# Patient Record
Sex: FEMALE | Race: WHITE | NOT HISPANIC OR LATINO
[De-identification: names, ages, dates, MRNs, and addresses within clinical notes are randomized per-mention and may not be internally consistent; named-entity substitution may affect disease eponyms.]

---

## 2017-05-19 ENCOUNTER — APPOINTMENT (OUTPATIENT)
Dept: OBGYN | Facility: CLINIC | Age: 47
End: 2017-05-19

## 2017-05-19 VITALS
SYSTOLIC BLOOD PRESSURE: 130 MMHG | WEIGHT: 142 LBS | HEIGHT: 61 IN | BODY MASS INDEX: 26.81 KG/M2 | DIASTOLIC BLOOD PRESSURE: 87 MMHG

## 2017-05-22 LAB — HPV HIGH+LOW RISK DNA PNL CVX: NEGATIVE

## 2017-05-30 LAB — CYTOLOGY CVX/VAG DOC THIN PREP: NORMAL

## 2018-07-06 ENCOUNTER — APPOINTMENT (OUTPATIENT)
Dept: OBGYN | Facility: CLINIC | Age: 48
End: 2018-07-06
Payer: COMMERCIAL

## 2018-07-06 VITALS
DIASTOLIC BLOOD PRESSURE: 87 MMHG | HEART RATE: 82 BPM | HEIGHT: 61 IN | SYSTOLIC BLOOD PRESSURE: 136 MMHG | WEIGHT: 150.4 LBS | BODY MASS INDEX: 28.4 KG/M2

## 2018-07-06 DIAGNOSIS — Z87.42 PERSONAL HISTORY OF OTHER DISEASES OF THE FEMALE GENITAL TRACT: ICD-10-CM

## 2018-07-06 PROCEDURE — 99396 PREV VISIT EST AGE 40-64: CPT

## 2018-07-09 LAB — HPV HIGH+LOW RISK DNA PNL CVX: NOT DETECTED

## 2018-07-11 LAB — CYTOLOGY CVX/VAG DOC THIN PREP: NORMAL

## 2019-09-06 ENCOUNTER — APPOINTMENT (OUTPATIENT)
Dept: OBGYN | Facility: CLINIC | Age: 49
End: 2019-09-06
Payer: COMMERCIAL

## 2019-09-06 VITALS
WEIGHT: 147 LBS | DIASTOLIC BLOOD PRESSURE: 80 MMHG | HEIGHT: 61 IN | SYSTOLIC BLOOD PRESSURE: 132 MMHG | BODY MASS INDEX: 27.75 KG/M2 | HEART RATE: 80 BPM

## 2019-09-06 DIAGNOSIS — Z82.49 FAMILY HISTORY OF ISCHEMIC HEART DISEASE AND OTHER DISEASES OF THE CIRCULATORY SYSTEM: ICD-10-CM

## 2019-09-06 PROCEDURE — 99396 PREV VISIT EST AGE 40-64: CPT

## 2019-09-06 NOTE — HISTORY OF PRESENT ILLNESS
[1 Year Ago] : 1 year ago [Good] : being in good health [Healthy Diet] : a healthy diet [Regular Exercise] : regular exercise [Last Mammogram ___] : Last Mammogram was [unfilled] [Last Pap ___] : Last cervical pap smear was [unfilled] [Last Colonoscopy ___] : Last colonoscopy [unfilled] [Perimenopausal] : is perimenopausal [Pregnancy History] : pregnancy history: [Up to Date] : up to date with ~his/her~ STD screening [Menstrual Problems] : reports normal menses [Weight Concerns] : no concerns with her weight

## 2019-09-06 NOTE — CHIEF COMPLAINT
[Annual Visit] : annual visit [FreeTextEntry1] : 50YO P1 LMP 7/25 menses less regular and less frequent,no complaints.

## 2019-09-09 LAB — HPV HIGH+LOW RISK DNA PNL CVX: NOT DETECTED

## 2019-09-12 LAB — CYTOLOGY CVX/VAG DOC THIN PREP: NORMAL

## 2019-10-25 ENCOUNTER — RESULT REVIEW (OUTPATIENT)
Age: 49
End: 2019-10-25

## 2020-10-23 ENCOUNTER — APPOINTMENT (OUTPATIENT)
Dept: OBGYN | Facility: CLINIC | Age: 50
End: 2020-10-23
Payer: SELF-PAY

## 2020-10-23 VITALS
HEART RATE: 91 BPM | DIASTOLIC BLOOD PRESSURE: 80 MMHG | BODY MASS INDEX: 29.83 KG/M2 | HEIGHT: 61 IN | SYSTOLIC BLOOD PRESSURE: 140 MMHG | WEIGHT: 158 LBS

## 2020-10-23 PROCEDURE — 99396 PREV VISIT EST AGE 40-64: CPT

## 2020-10-23 NOTE — HISTORY OF PRESENT ILLNESS
[Patient reported mammogram was normal] : Patient reported mammogram was normal [Patient reported breast sonogram was normal] : Patient reported breast sonogram was normal [Patient reported PAP Smear was normal] : Patient reported PAP Smear was normal [Patient reported colonoscopy was normal] : Patient reported colonoscopy was normal [FreeTextEntry1] : 49YO P1 LMP 10/4  5/7, occassional hot flashes, no complaints. [Mammogramdate] : 5/20 [BreastSonogramDate] : 5/20 [PapSmeardate] : 2019 [ColonoscopyDate] : 8/18

## 2020-10-27 LAB
CYTOLOGY CVX/VAG DOC THIN PREP: NORMAL
HPV HIGH+LOW RISK DNA PNL CVX: NOT DETECTED

## 2021-10-21 NOTE — PHYSICAL EXAM
Patient made aware of 24/7 emergency services. [Awake] : awake [Alert] : alert [LAD] : no lymphadenopathy [Acute Distress] : no acute distress [Thyroid Nodule] : no thyroid nodule [Goiter] : no goiter [Mass] : no breast mass [Nipple Discharge] : no nipple discharge [Axillary LAD] : no axillary lymphadenopathy [Soft] : soft [Distended] : not distended [Tender] : non tender [Oriented x3] : oriented to person, place, and time [H/Smegaly] : no hepatosplenomegaly [Depressed Mood] : not depressed [Flat Affect] : affect not flat [Normal] : uterus [No Bleeding] : there was no active vaginal bleeding [Anteversion] : anteverted [Uterine Adnexae] : were not tender and not enlarged [Tenderness] : nontender [RRR, No Murmurs] : RRR, no murmurs [CTAB] : CTAB

## 2022-06-17 ENCOUNTER — APPOINTMENT (OUTPATIENT)
Dept: OBGYN | Facility: CLINIC | Age: 52
End: 2022-06-17

## 2022-09-06 ENCOUNTER — NON-APPOINTMENT (OUTPATIENT)
Age: 52
End: 2022-09-06

## 2022-09-09 ENCOUNTER — APPOINTMENT (OUTPATIENT)
Dept: PLASTIC SURGERY | Facility: CLINIC | Age: 52
End: 2022-09-09

## 2022-09-09 VITALS
TEMPERATURE: 98 F | HEIGHT: 61 IN | HEART RATE: 95 BPM | WEIGHT: 158 LBS | BODY MASS INDEX: 29.83 KG/M2 | DIASTOLIC BLOOD PRESSURE: 85 MMHG | SYSTOLIC BLOOD PRESSURE: 145 MMHG | OXYGEN SATURATION: 98 %

## 2022-09-09 PROCEDURE — 99213 OFFICE O/P EST LOW 20 MIN: CPT

## 2022-09-09 RX ORDER — MIRTAZAPINE 30 MG/1
TABLET, FILM COATED ORAL
Refills: 0 | Status: ACTIVE | COMMUNITY

## 2022-09-09 NOTE — ASSESSMENT
[FreeTextEntry1] : Family history of breast cancer.\par She is at elevated risk for breast cancer, and therefore she undergoes annual screening breast MRI.\par \par 1. Annual bilateral mammogram and breast ultrasound due 8/2023\par 2. Follow up office visit due in 6 months - 3/2023\par 3. Advised monthly self breast examinations and advised her to contact me if she has any concerns. \par 4. Advise screening bilateral breast MRI 12/2022, Rx provided. She will make appt Shaun.\par \par This patient encounter took 30 minutes today to perform records review, chart preparation, clinical encounter, coordination of care and documentation.

## 2022-09-09 NOTE — REASON FOR VISIT
[Follow-Up: _____] : a [unfilled] follow-up visit [FreeTextEntry1] : increased risk for breast cancer: MICHELLE lifetime estimated breast cancer risk 34.9%

## 2022-09-09 NOTE — CONSULT LETTER
[Dear  ___] : Dear  [unfilled], [Consult Letter:] : I had the pleasure of evaluating your patient, [unfilled]. [Please see my note below.] : Please see my note below. [Consult Closing:] : Thank you very much for allowing me to participate in the care of this patient.  If you have any questions, please do not hesitate to contact me. [Sincerely,] : Sincerely, [FreeTextEntry3] : Susan M. Palleschi, MD, FACS\par Division of Breast Surgery\par Director, Breast Surgery\par [facility]\par [address] [place] [address]\par [address]\[place] [address]\par (Phone) [phone]\par (Fax) [phone]

## 2022-09-09 NOTE — HISTORY OF PRESENT ILLNESS
[FreeTextEntry1] : Patient is a 52 year old female, here today for routine breast evaluation. \par She was last seen by me in office on 2/18/2022. \par She has a family history of breast cancer in her grandmother at age 80 and in her sister at age 40. \par MICHELLE lifetime estimated breast cancer risk 34.9%\par She has a history of right 10:00 breast biopsy in 11/2006, pathology benign. \par s/p bilateral breast augmentation\par 8/31/2021 Bilateral mammogram/ultrasound: benign, Bi-rads 2.\par 10/29/2021 Bilateral breast MRI: benign, Bi-rads 2. Advise 1 year followup.\par 8/8/2022 Bilateral mammogram: The patient had clear/yellow nipple discharge on the left upon mammography compression. No significant masses, calcifications, or other abnormal findings are seen in either breast. There are bilateral intact retropectoral silicone implants. \par Bilateral breast ultrasound: No suspicious findings were seen sonographically in either breast. There are bilateral stable benign appearing inframammary lymph nodes as follows-\par Right 9:00 (10cmFN) measuring 7 x 5 x 6 mm\par Right 9:00 (10cmFN) measuring 3 x 2 x 3 mm\par Left 2:00 axillary tail measuring 6 x 5 x 6 mm\par There is no mammographic or sonographic evidence for malignancy in either breast. The patient was informed that if she sees unilateral spontaneous nipple discharge, breast MRI should be considered. She does go for routine annual screening breast MRI. Bi-rads 2. \par 9/9/2022- patient presents for 6 month follow up. She is doing well although reports she has been depressed over the last few months since she sold her house and her daughter went away to school. However she started taking mirtazapine and has been doing significantly better. Denies any breast symptoms.

## 2022-09-09 NOTE — PHYSICAL EXAM
[Normocephalic] : normocephalic [Supple] : supple [No dominant masses] : no dominant masses in right breast  [No dominant masses] : no dominant masses left breast [No Nipple Retraction] : no left nipple retraction [No Nipple Discharge] : no left nipple discharge [No Axillary Lymphadenopathy] : no left axillary lymphadenopathy [Soft] : abdomen soft [No Edema] : no edema [No Swelling] : no swelling [Full ROM] : full range of motion [No Rashes] : no rashes [No Ulceration] : no ulceration [Atraumatic] : atraumatic [EOMI] : extra ocular movement intact [Sclera nonicteric] : sclera nonicteric [No Supraclavicular Adenopathy] : no supraclavicular adenopathy [No Cervical Adenopathy] : no cervical adenopathy [No Thyromegaly] : no thyromegaly [Examined in the supine and seated position] : examined in the supine and seated position [Breast Nipple Inversion] : nipples not inverted [Breast Nipple Retraction] : nipples not retracted [Breast Abnormal Secretion Bloody Fluid] : no bloody discharge [de-identified] : s/p mastopexy and breast implant placement

## 2022-10-17 ENCOUNTER — APPOINTMENT (OUTPATIENT)
Dept: ORTHOPEDIC SURGERY | Facility: CLINIC | Age: 52
End: 2022-10-17

## 2022-10-17 VITALS — WEIGHT: 160 LBS | BODY MASS INDEX: 29.82 KG/M2 | HEIGHT: 61.5 IN

## 2022-10-17 DIAGNOSIS — M79.18 MYALGIA, OTHER SITE: ICD-10-CM

## 2022-10-17 PROCEDURE — 99204 OFFICE O/P NEW MOD 45 MIN: CPT | Mod: 25

## 2022-10-17 PROCEDURE — 20610 DRAIN/INJ JOINT/BURSA W/O US: CPT

## 2022-10-17 PROCEDURE — 73564 X-RAY EXAM KNEE 4 OR MORE: CPT | Mod: RT

## 2022-10-17 PROCEDURE — J3490M: CUSTOM

## 2022-10-17 NOTE — IMAGING

## 2022-10-17 NOTE — HISTORY OF PRESENT ILLNESS
[de-identified] : 52 year old female  ( LHD,  ) chronic left knee pain wroseing since 10/1/22  \par The pain is located  anterior, medial \par The pain is associated with  weakness, swelling \par Worse with activity and better at rest.\par Has tried ice, Advil \par

## 2022-10-17 NOTE — ASSESSMENT
[FreeTextEntry1] : Right X-Ray Examination of the KNEE (4 views): medial and patellofemoral degenerate changes.\par \par - We discussed their diagnosis and treatment options at length including the risks and benefits of both surgical and non-surgical options.\par - We will continue conservative treatment with activity modification, PT, icing, weight loss, and anti-inflammatory medications.\par - The patient was provided with a PT prescription to work on ROM, hip ER/abductors strengthening, quad/hamstring stretches and strengthening, and other exercises \par - The patient was advised to let pain guide the gradual advancement of activities. \par - We also discussed the possible of a corticosteroid injection in order to help decrease inflammation and pain so that they can perform better therapy.\par - The risks, benefits, and alternatives to corticosteroid injection were reviewed with the patient and they wished to proceed with this treatment course. \par - Follow up as needed in 6 weeks to re-evaluate, if no improvement we spoke about possibility of viscosupplementation injections\par

## 2023-01-09 ENCOUNTER — APPOINTMENT (OUTPATIENT)
Dept: ORTHOPEDIC SURGERY | Facility: CLINIC | Age: 53
End: 2023-01-09
Payer: COMMERCIAL

## 2023-01-09 VITALS — WEIGHT: 160 LBS | HEIGHT: 61.5 IN | BODY MASS INDEX: 29.82 KG/M2

## 2023-01-09 PROCEDURE — 99214 OFFICE O/P EST MOD 30 MIN: CPT

## 2023-01-09 NOTE — HISTORY OF PRESENT ILLNESS
[Dull/Aching] : dull/aching [Sharp] : sharp [Intermittent] : intermittent [Walking/activity] : walking/activity [Sitting] : sitting [Lying in bed] : lying in bed [Full time] : Work status: full time [de-identified] : Patient is a 52-year-old female with onset of pain right knee after starting workout program in September.  No injury.  She had did flareup of pain and sore Dr. Kahn on October 17.  She had cortisone injection with some mild improvement.  She had gone for physical therapy and has been doing home exercises.  She has mild pain right knee standing and walking.  Mild to moderate pain with stairs and movie sign. [] : Post Surgical Visit: no [de-identified] : Dr. Kahn [de-identified] : 10/17/22 [de-identified] :

## 2023-01-09 NOTE — DISCUSSION/SUMMARY
[de-identified] : Ice p.r.n.\par Tylenol p.r.n.\par She will avoid irritating activities and exercises\par Continue home exercise program\par She will have MRI right knee\par I discussed Euflexxa injections and gave her a pamphlet.  Risks benefits and the alternatives were discussed.  She would like to do this\par \par Impression:\par Mild osteoarthritis right knee/chondromalacia patella, rule out tear medial meniscus

## 2023-01-09 NOTE — IMAGING
[Bilateral] : knee bilaterally [de-identified] : Normal gait\par \par Right knee\par No swelling\par Mild medial facet and joint line tenderness\par Passive range of motion 0° to 125°\par Ligaments are stable\par Quad strength 5/5\par \par Right leg\par No swelling\par Calf is soft and nontender\par Dorsalis pedis pulse 2+ [FreeTextEntry9] : Reviewed and interpreted.  X-rays right knee done October 17, 2022.  AP standing, lateral, sunrise, tunnel views-mild degenerative changes with narrowing the medial compartment on AP standing view, spurring patellofemoral joint.  Vascular clips medial soft tissues\par \par Reviewed and interpreted.  X-rays left knee done October 17, 2022.  AP standing, lateral, sunrise, tunnel views-mild to moderate degenerative changes with narrowing the medial compartment on AP standing view, spurring patellofemoral joint

## 2023-01-12 ENCOUNTER — FORM ENCOUNTER (OUTPATIENT)
Age: 53
End: 2023-01-12

## 2023-01-13 ENCOUNTER — APPOINTMENT (OUTPATIENT)
Dept: MRI IMAGING | Facility: CLINIC | Age: 53
End: 2023-01-13
Payer: COMMERCIAL

## 2023-01-13 PROCEDURE — 73721 MRI JNT OF LWR EXTRE W/O DYE: CPT | Mod: RT

## 2023-01-17 ENCOUNTER — NON-APPOINTMENT (OUTPATIENT)
Age: 53
End: 2023-01-17

## 2023-01-30 ENCOUNTER — APPOINTMENT (OUTPATIENT)
Dept: ORTHOPEDIC SURGERY | Facility: CLINIC | Age: 53
End: 2023-01-30
Payer: COMMERCIAL

## 2023-01-30 VITALS — BODY MASS INDEX: 29.82 KG/M2 | HEIGHT: 61.5 IN | WEIGHT: 160 LBS

## 2023-01-30 DIAGNOSIS — R93.6 ABNORMAL FINDINGS ON DIAGNOSTIC IMAGING OF LIMBS: ICD-10-CM

## 2023-01-30 PROCEDURE — 99214 OFFICE O/P EST MOD 30 MIN: CPT | Mod: 25

## 2023-01-30 PROCEDURE — 20611 DRAIN/INJ JOINT/BURSA W/US: CPT | Mod: RT

## 2023-02-06 ENCOUNTER — APPOINTMENT (OUTPATIENT)
Dept: ORTHOPEDIC SURGERY | Facility: CLINIC | Age: 53
End: 2023-02-06
Payer: COMMERCIAL

## 2023-02-06 PROCEDURE — 20611 DRAIN/INJ JOINT/BURSA W/US: CPT

## 2023-02-06 RX ORDER — HYALURONATE SODIUM 20 MG/2 ML
20 SYRINGE (ML) INTRAARTICULAR
Refills: 0 | Status: COMPLETED | OUTPATIENT
Start: 2023-02-06

## 2023-02-06 RX ADMIN — Medication MG/2ML: at 00:00

## 2023-02-06 NOTE — HISTORY OF PRESENT ILLNESS
[Gradual] : gradual [3] : 3 [Dull/Aching] : dull/aching [Intermittent] : intermittent [Rest] : rest [Stairs] : stairs [Full time] : Work status: full time [1] : 1 [Euflexxa] : Euflexxa [de-identified] : The patient has continued pain right knee.  Mild pain standing and walking.  Pain with stairs and movie sign.  Doing home exercises.  She had MRI right knee [] : Post Surgical Visit: no [de-identified] : 01/09/23 [de-identified] : Dr. Delarosa

## 2023-02-06 NOTE — DISCUSSION/SUMMARY
[de-identified] : MRI results right knee were discussed with the patient\par Ice p.r.n.\par Tylenol p.r.n.\par She will avoid irritating activities and exercises\par Continue home exercise program\par Again, I discussed Euflexxa injections.  Risks benefits and the alternatives were discussed.  She would like to do this\par She is going to be going to [facility] for several days on February 9\par \par Impression:\par Mild osteoarthritis right knee/chondromalacia patella, Tear medial meniscus

## 2023-02-06 NOTE — DISCUSSION/SUMMARY
[de-identified] : MRI results right knee were discussed with the patient\par Ice p.r.n.\par Tylenol p.r.n.\par She will avoid irritating activities and exercises\par Continue home exercise program\par She is going to be going to [facility] for several days on February 8\par \par Impression:\par Mild osteoarthritis right knee/chondromalacia patella, Tear medial meniscus

## 2023-02-06 NOTE — PROCEDURE
[Large Joint Injection] : Large joint injection [Right] : of the right [Knee] : knee [Pain] : pain [Alcohol] : alcohol [Betadine] : betadine [Ethyl Chloride sprayed topically] : ethyl chloride sprayed topically [Sterile technique used] : sterile technique used [Euflexxa(20mg)] : 20mg of Euflexxa [#1] : series #1 [Patient was advised to rest the joint(s) for ____ days] : patient was advised to rest the joint(s) for [unfilled] days [Risks, benefits, alternatives discussed / Verbal consent obtained] : the risks benefits, and alternatives have been discussed, and verbal consent was obtained [de-identified] : To ensure intra-articular injection [FreeTextEntry3] : Do not submerge underwater for 24 hours

## 2023-02-06 NOTE — DATA REVIEWED
[MRI] : MRI [Right] : of the right [Knee] : knee [I independently reviewed and interpreted images and report] : I independently reviewed and interpreted images and report [FreeTextEntry1] : MRI right knee done January 13, 2023 was reviewed. There is complex tear medial meniscus. Degenerative changes medial compartment with mild edema posteromedial tibial plateau. Mild to moderate effusion. \par

## 2023-02-06 NOTE — HISTORY OF PRESENT ILLNESS
[Gradual] : gradual [3] : 3 [Dull/Aching] : dull/aching [Intermittent] : intermittent [Leisure] : leisure [Rest] : rest [Injection therapy] : injection therapy [Stairs] : stairs [Full time] : Work status: full time [2] : 2 [Euflexxa] : Euflexxa [de-identified] : The patient has continued pain right knee.  Mild pain standing and walking.  Pain after sitting and getting up.  No change after the first Euflexxa injection [] : Post Surgical Visit: no [de-identified] : 1/30/2023 [de-identified] : Dr. Delarosa  [de-identified] : 1/30/2023

## 2023-02-06 NOTE — PROCEDURE
[Large Joint Injection] : Large joint injection [Right] : of the right [Knee] : knee [Pain] : pain [Alcohol] : alcohol [Betadine] : betadine [Ethyl Chloride sprayed topically] : ethyl chloride sprayed topically [Sterile technique used] : sterile technique used [Euflexxa(20mg)] : 20mg of Euflexxa [#2] : series #2 [Patient was advised to rest the joint(s) for ____ days] : patient was advised to rest the joint(s) for [unfilled] days [Risks, benefits, alternatives discussed / Verbal consent obtained] : the risks benefits, and alternatives have been discussed, and verbal consent was obtained [de-identified] : To ensure intra-articular injection [FreeTextEntry3] : Do not submerge underwater for 24 hours

## 2023-02-06 NOTE — IMAGING
[de-identified] : Normal gait\par \par Right knee\par No swelling\par Mild medial facet and joint line tenderness\par Passive range of motion 0° to 125°\par \par Right leg\par No swelling\par Calf is soft and nontender\par

## 2023-02-06 NOTE — IMAGING
[de-identified] : Normal gait\par \par Right knee\par No swelling\par Mild medial facet and joint line tenderness\par Passive range of motion 0° to 125°\par \par Right leg\par No swelling\par Calf is soft and nontender\par

## 2023-02-27 ENCOUNTER — APPOINTMENT (OUTPATIENT)
Dept: ORTHOPEDIC SURGERY | Facility: CLINIC | Age: 53
End: 2023-02-27
Payer: COMMERCIAL

## 2023-02-27 VITALS — BODY MASS INDEX: 29.82 KG/M2 | WEIGHT: 160 LBS | HEIGHT: 61.5 IN

## 2023-02-27 DIAGNOSIS — M17.11 UNILATERAL PRIMARY OSTEOARTHRITIS, RIGHT KNEE: ICD-10-CM

## 2023-02-27 DIAGNOSIS — M22.41 CHONDROMALACIA PATELLAE, RIGHT KNEE: ICD-10-CM

## 2023-02-27 PROCEDURE — 20611 DRAIN/INJ JOINT/BURSA W/US: CPT

## 2023-02-27 RX ORDER — HYALURONATE SODIUM 20 MG/2 ML
20 SYRINGE (ML) INTRAARTICULAR
Refills: 0 | Status: COMPLETED | OUTPATIENT
Start: 2023-02-27

## 2023-02-27 RX ADMIN — Medication MG/2ML: at 00:00

## 2023-02-27 NOTE — IMAGING
[de-identified] : Normal gait\par \par Right knee\par No swelling\par Mild medial facet and joint line tenderness\par Passive range of motion 0° to 125°\par \par Right leg\par No swelling\par Calf is soft and nontender\par

## 2023-02-27 NOTE — PROCEDURE
[Large Joint Injection] : Large joint injection [Right] : of the right [Knee] : knee [Pain] : pain [Alcohol] : alcohol [Betadine] : betadine [Ethyl Chloride sprayed topically] : ethyl chloride sprayed topically [Sterile technique used] : sterile technique used [Euflexxa(20mg)] : 20mg of Euflexxa [#3] : series #3 [Patient was advised to rest the joint(s) for ____ days] : patient was advised to rest the joint(s) for [unfilled] days [Risks, benefits, alternatives discussed / Verbal consent obtained] : the risks benefits, and alternatives have been discussed, and verbal consent was obtained [de-identified] : To ensure intra-articular injection [FreeTextEntry3] : Do not submerge underwater for 24 hours

## 2023-02-27 NOTE — DISCUSSION/SUMMARY
[de-identified] : Ice p.r.n.\par Tylenol p.r.n.\par She will avoid irritating activities and exercises\par Continue home exercise program\par Return for followup in 6 weeks if any persistent problems, otherwise p.r.n.\par \par Impression:\par Mild osteoarthritis right knee/chondromalacia patella, Tear medial meniscus

## 2023-02-27 NOTE — HISTORY OF PRESENT ILLNESS
[Gradual] : gradual [Dull/Aching] : dull/aching [Intermittent] : intermittent [Leisure] : leisure [Rest] : rest [Walking] : walking [Full time] : Work status: full time [3] : 3 [Euflexxa] : Euflexxa [de-identified] : The patient feels better after the second Euflexxa injection right knee.  She has mild pain occasionally standing and walking [] : Post Surgical Visit: no [de-identified] : 2/6/2023 [de-identified] : Dr. Delarosa  [de-identified] :   [de-identified] : 2/6/2023

## 2023-04-26 ENCOUNTER — NON-APPOINTMENT (OUTPATIENT)
Age: 53
End: 2023-04-26

## 2023-05-01 DIAGNOSIS — Z80.3 FAMILY HISTORY OF MALIGNANT NEOPLASM OF BREAST: ICD-10-CM

## 2023-05-22 ENCOUNTER — APPOINTMENT (OUTPATIENT)
Dept: OBGYN | Facility: CLINIC | Age: 53
End: 2023-05-22
Payer: COMMERCIAL

## 2023-05-22 VITALS — DIASTOLIC BLOOD PRESSURE: 87 MMHG | BODY MASS INDEX: 30.49 KG/M2 | WEIGHT: 164 LBS | SYSTOLIC BLOOD PRESSURE: 134 MMHG

## 2023-05-22 DIAGNOSIS — M17.11 UNILATERAL PRIMARY OSTEOARTHRITIS, RIGHT KNEE: ICD-10-CM

## 2023-05-22 DIAGNOSIS — Z01.419 ENCOUNTER FOR GYNECOLOGICAL EXAMINATION (GENERAL) (ROUTINE) W/OUT ABNORMAL FINDINGS: ICD-10-CM

## 2023-05-22 DIAGNOSIS — N95.2 POSTMENOPAUSAL ATROPHIC VAGINITIS: ICD-10-CM

## 2023-05-22 PROCEDURE — 99396 PREV VISIT EST AGE 40-64: CPT

## 2023-05-22 RX ORDER — LORAZEPAM 0.5 MG/1
0.5 TABLET ORAL
Qty: 60 | Refills: 0 | Status: ACTIVE | COMMUNITY
Start: 2023-04-18

## 2023-05-22 RX ORDER — ESTRADIOL 0.1 MG/G
0.1 CREAM VAGINAL
Qty: 1 | Refills: 2 | Status: ACTIVE | COMMUNITY
Start: 2023-05-22 | End: 1900-01-01

## 2023-05-22 RX ORDER — SODIUM SULFATE, MAGNESIUM SULFATE, AND POTASSIUM CHLORIDE 17.75; 2.7; 2.25 G/1; G/1; G/1
1479-225-188 TABLET ORAL
Qty: 24 | Refills: 0 | Status: COMPLETED | COMMUNITY
Start: 2023-03-13

## 2023-05-22 NOTE — HISTORY OF PRESENT ILLNESS
[Patient reported mammogram was normal] : Patient reported mammogram was normal [Patient reported breast sonogram was normal] : Patient reported breast sonogram was normal [Patient reported colonoscopy was normal] : Patient reported colonoscopy was normal [FreeTextEntry1] : 53YO P1 LMP 4/30/22 had major depressive episode last yr [id number], responded to Remeron, which she is now weaning, no major complaints. [Mammogramdate] : 8/22 [BreastSonogramDate] : 8/22 [PapSmeardate] : 2020 [ColonoscopyDate] : 4/23

## 2023-05-23 ENCOUNTER — TRANSCRIPTION ENCOUNTER (OUTPATIENT)
Age: 53
End: 2023-05-23

## 2023-05-23 LAB — HPV HIGH+LOW RISK DNA PNL CVX: NOT DETECTED

## 2023-05-26 ENCOUNTER — OUTPATIENT (OUTPATIENT)
Dept: OUTPATIENT SERVICES | Facility: HOSPITAL | Age: 53
LOS: 1 days | End: 2023-05-26
Payer: COMMERCIAL

## 2023-05-26 ENCOUNTER — APPOINTMENT (OUTPATIENT)
Dept: MRI IMAGING | Facility: CLINIC | Age: 53
End: 2023-05-26
Payer: COMMERCIAL

## 2023-05-26 DIAGNOSIS — Z80.3 FAMILY HISTORY OF MALIGNANT NEOPLASM OF BREAST: ICD-10-CM

## 2023-05-26 LAB — CYTOLOGY CVX/VAG DOC THIN PREP: NORMAL

## 2023-05-26 PROCEDURE — A9585: CPT

## 2023-05-26 PROCEDURE — C8908: CPT

## 2023-05-26 PROCEDURE — C8937: CPT

## 2023-05-26 PROCEDURE — 77049 MRI BREAST C-+ W/CAD BI: CPT | Mod: 26

## 2023-05-30 ENCOUNTER — NON-APPOINTMENT (OUTPATIENT)
Age: 53
End: 2023-05-30

## 2023-07-25 ENCOUNTER — APPOINTMENT (OUTPATIENT)
Dept: PLASTIC SURGERY | Facility: CLINIC | Age: 53
End: 2023-07-25
Payer: COMMERCIAL

## 2023-07-25 VITALS
SYSTOLIC BLOOD PRESSURE: 139 MMHG | DIASTOLIC BLOOD PRESSURE: 91 MMHG | BODY MASS INDEX: 30.21 KG/M2 | OXYGEN SATURATION: 99 % | HEART RATE: 96 BPM | WEIGHT: 160 LBS | HEIGHT: 61 IN | TEMPERATURE: 97.2 F

## 2023-07-25 DIAGNOSIS — Z12.39 ENCOUNTER FOR OTHER SCREENING FOR MALIGNANT NEOPLASM OF BREAST: ICD-10-CM

## 2023-07-25 DIAGNOSIS — Z91.89 OTHER SPECIFIED PERSONAL RISK FACTORS, NOT ELSEWHERE CLASSIFIED: ICD-10-CM

## 2023-07-25 PROCEDURE — 99213 OFFICE O/P EST LOW 20 MIN: CPT

## 2023-07-25 NOTE — PHYSICAL EXAM
[Normocephalic] : normocephalic [Atraumatic] : atraumatic [Supple] : supple [No Supraclavicular Adenopathy] : no supraclavicular adenopathy [No Cervical Adenopathy] : no cervical adenopathy [No Thyromegaly] : no thyromegaly [Examined in the supine and seated position] : examined in the supine and seated position [No dominant masses] : no dominant masses in right breast  [No dominant masses] : no dominant masses left breast [No Nipple Retraction] : no left nipple retraction [No Nipple Discharge] : no left nipple discharge [No Axillary Lymphadenopathy] : no left axillary lymphadenopathy [No Edema] : no edema [No Rashes] : no rashes [No Ulceration] : no ulceration [de-identified] :  s/p mastopexy and breast implants

## 2023-07-25 NOTE — HISTORY OF PRESENT ILLNESS
[FreeTextEntry1] : Patient is a 52 year old female here today for a follow up visit. \par She has a family history of breast cancer in her grandmother at age 80 and in her sister at age 40. \par MICHELLE lifetime estimated breast cancer risk 34.9%\par She has a history of right 10:00 breast biopsy in 11/2006, pathology benign. \par s/p bilateral breast augmentation\par 10/29/2021 Bilateral breast MRI: benign, Bi-rads 2. Advise 1 year followup.\par 8/8/2022 Bilateral mammogram: The patient had clear/yellow nipple discharge on the left upon mammography compression. No significant masses, calcifications, or other abnormal findings are seen in either breast. There are bilateral intact retropectoral silicone implants. \par Bilateral breast ultrasound: No suspicious findings were seen sonographically in either breast. There are bilateral stable benign appearing inframammary lymph nodes as follows-\par Right 9:00 (10cmFN) measuring 7 x 5 x 6 mm\par Right 9:00 (10cmFN) measuring 3 x 2 x 3 mm\par Left 2:00 axillary tail measuring 6 x 5 x 6 mm\par There is no mammographic or sonographic evidence for malignancy in either breast. The patient was informed that if she sees unilateral spontaneous nipple discharge, breast MRI should be considered. She does go for routine annual screening breast MRI. Bi-rads 2. \par 5/26/2023 Bilateral breast MRI: negative, intact bilateral implants. Bi-rads 2\par She denies any concerns on self breast examination.  She will be moving to [place] at the end of 8/2023

## 2023-07-25 NOTE — ASSESSMENT
[FreeTextEntry1] : Elevated breast cancer risk\par Clinical breast examination negative\par \par 1. Annual bilateral mammogram and breast ultrasound due 8/2023. Rx, Shaun.\par 2. High risk screening breast MRI 5/2024.\par 3. Follow up office visit 1 year\par 4. Advised monthly self breast examinations and advised her to contact me if she has any concerns. \par 5.  She is moving to [place].  She will likely undergo breast imaging next year in [place] and return to me once a year for her annual clinical breast examination.

## 2023-07-25 NOTE — CONSULT LETTER
[Dear  ___] : Dear  [unfilled], [Courtesy Letter:] : I had the pleasure of seeing your patient, [unfilled], in my office today. [Please see my note below.] : Please see my note below. [Sincerely,] : Sincerely, [FreeTextEntry3] : Susan M. Palleschi, MD, FACS\par Division of Breast Surgery\par Director, Breast Surgery\par [facility]\par [address] [place] [address]\par [address]\[place] [address]\par (Phone) [phone]\par (Fax) [phone]

## 2023-08-25 ENCOUNTER — RESULT REVIEW (OUTPATIENT)
Age: 53
End: 2023-08-25

## 2023-08-25 ENCOUNTER — APPOINTMENT (OUTPATIENT)
Dept: RADIOLOGY | Facility: CLINIC | Age: 53
End: 2023-08-25
Payer: COMMERCIAL

## 2023-08-25 ENCOUNTER — APPOINTMENT (OUTPATIENT)
Dept: ULTRASOUND IMAGING | Facility: CLINIC | Age: 53
End: 2023-08-25
Payer: COMMERCIAL

## 2023-08-25 ENCOUNTER — APPOINTMENT (OUTPATIENT)
Dept: MAMMOGRAPHY | Facility: CLINIC | Age: 53
End: 2023-08-25
Payer: COMMERCIAL

## 2023-08-25 ENCOUNTER — OUTPATIENT (OUTPATIENT)
Dept: OUTPATIENT SERVICES | Facility: HOSPITAL | Age: 53
LOS: 1 days | End: 2023-08-25
Payer: COMMERCIAL

## 2023-08-25 DIAGNOSIS — Z91.89 OTHER SPECIFIED PERSONAL RISK FACTORS, NOT ELSEWHERE CLASSIFIED: ICD-10-CM

## 2023-08-25 DIAGNOSIS — Z12.39 ENCOUNTER FOR OTHER SCREENING FOR MALIGNANT NEOPLASM OF BREAST: ICD-10-CM

## 2023-08-25 PROCEDURE — 77063 BREAST TOMOSYNTHESIS BI: CPT | Mod: 26

## 2023-08-25 PROCEDURE — 77067 SCR MAMMO BI INCL CAD: CPT | Mod: 26

## 2023-08-25 PROCEDURE — 77067 SCR MAMMO BI INCL CAD: CPT

## 2023-08-25 PROCEDURE — 77085 DXA BONE DENSITY AXL VRT FX: CPT | Mod: 26

## 2023-08-25 PROCEDURE — 76641 ULTRASOUND BREAST COMPLETE: CPT

## 2023-08-25 PROCEDURE — 76641 ULTRASOUND BREAST COMPLETE: CPT | Mod: 26,50

## 2023-08-25 PROCEDURE — 77085 DXA BONE DENSITY AXL VRT FX: CPT

## 2023-08-25 PROCEDURE — 77063 BREAST TOMOSYNTHESIS BI: CPT

## 2024-05-24 ENCOUNTER — APPOINTMENT (OUTPATIENT)
Dept: MRI IMAGING | Facility: CLINIC | Age: 54
End: 2024-05-24
Payer: COMMERCIAL

## 2024-05-24 ENCOUNTER — OUTPATIENT (OUTPATIENT)
Dept: OUTPATIENT SERVICES | Facility: HOSPITAL | Age: 54
LOS: 1 days | End: 2024-05-24
Payer: COMMERCIAL

## 2024-05-24 DIAGNOSIS — Z91.89 OTHER SPECIFIED PERSONAL RISK FACTORS, NOT ELSEWHERE CLASSIFIED: ICD-10-CM

## 2024-05-24 PROCEDURE — 77049 MRI BREAST C-+ W/CAD BI: CPT | Mod: 26

## 2024-05-24 PROCEDURE — A9585: CPT

## 2024-05-24 PROCEDURE — C8908: CPT

## 2024-05-24 PROCEDURE — C8937: CPT

## 2024-05-28 ENCOUNTER — NON-APPOINTMENT (OUTPATIENT)
Age: 54
End: 2024-05-28

## 2024-08-12 ENCOUNTER — APPOINTMENT (OUTPATIENT)
Dept: PLASTIC SURGERY | Facility: CLINIC | Age: 54
End: 2024-08-12
Payer: COMMERCIAL

## 2024-08-12 VITALS
OXYGEN SATURATION: 99 % | BODY MASS INDEX: 30.21 KG/M2 | SYSTOLIC BLOOD PRESSURE: 149 MMHG | TEMPERATURE: 98.2 F | HEART RATE: 101 BPM | DIASTOLIC BLOOD PRESSURE: 93 MMHG | WEIGHT: 160 LBS | HEIGHT: 61 IN

## 2024-08-12 DIAGNOSIS — Z91.89 OTHER SPECIFIED PERSONAL RISK FACTORS, NOT ELSEWHERE CLASSIFIED: ICD-10-CM

## 2024-08-12 PROCEDURE — 99213 OFFICE O/P EST LOW 20 MIN: CPT

## 2024-08-12 NOTE — ASSESSMENT
[FreeTextEntry1] : Elevated breast cancer risk Clinical breast examination negative Recent breast MRI negative  1. Annual bilateral mammogram and breast ultrasound due 8/2024. Rx provided. She will be returning to [place] from [place] 10/2024 and would like to undergo the imaging then. 2. High risk screening breast MRI 5/2025. 3. Follow up office visit 1 year. 4. Advised monthly self breast examinations and advised her to contact me if she has any concerns.

## 2024-08-12 NOTE — CONSULT LETTER
[Dear  ___] : Dear  [unfilled], [Courtesy Letter:] : I had the pleasure of seeing your patient, [unfilled], in my office today. [Please see my note below.] : Please see my note below. [Sincerely,] : Sincerely, [FreeTextEntry3] : Susan M. Palleschi, MD, FACS\par  Division of Breast Surgery\par  Director, Breast Surgery\par  [facility]\par  [address] [place] [address]\par  [address]\[place] [address]\par  (Phone) [phone]\par  (Fax) [phone]  [DrSunny  ___] : Dr. GARCIA

## 2024-08-12 NOTE — CONSULT LETTER
[Dear  ___] : Dear  [unfilled], [Courtesy Letter:] : I had the pleasure of seeing your patient, [unfilled], in my office today. [Please see my note below.] : Please see my note below. [Sincerely,] : Sincerely, [FreeTextEntry3] : Susan M. Palleschi, MD, FACS\par  Division of Breast Surgery\par  Director, Breast Surgery\par  [facility]\par  [address] [place] [address]\par  [address]\[place] [address]\par  (Phone) [phone]\par  (Fax) [phone]  [DrSunny  ___] : Dr. GARCIA

## 2024-08-12 NOTE — PHYSICAL EXAM
[Normocephalic] : normocephalic [Atraumatic] : atraumatic [Supple] : supple [No Supraclavicular Adenopathy] : no supraclavicular adenopathy [No Cervical Adenopathy] : no cervical adenopathy [No Thyromegaly] : no thyromegaly [Examined in the supine and seated position] : examined in the supine and seated position [No dominant masses] : no dominant masses in right breast  [No dominant masses] : no dominant masses left breast [No Nipple Retraction] : no left nipple retraction [No Nipple Discharge] : no left nipple discharge [No Axillary Lymphadenopathy] : no left axillary lymphadenopathy [No Edema] : no edema [No Rashes] : no rashes [No Ulceration] : no ulceration [de-identified] :  s/p mastopexy and breast implants

## 2024-08-12 NOTE — PHYSICAL EXAM
[Normocephalic] : normocephalic [Atraumatic] : atraumatic [Supple] : supple [No Supraclavicular Adenopathy] : no supraclavicular adenopathy [No Cervical Adenopathy] : no cervical adenopathy [No Thyromegaly] : no thyromegaly [Examined in the supine and seated position] : examined in the supine and seated position [No dominant masses] : no dominant masses in right breast  [No dominant masses] : no dominant masses left breast [No Nipple Retraction] : no left nipple retraction [No Nipple Discharge] : no left nipple discharge [No Axillary Lymphadenopathy] : no left axillary lymphadenopathy [No Edema] : no edema [No Rashes] : no rashes [No Ulceration] : no ulceration [de-identified] :  s/p mastopexy and breast implants

## 2024-08-12 NOTE — HISTORY OF PRESENT ILLNESS
[FreeTextEntry1] : Patient is a 53 year old female here today for a follow up visit.  She has a family history of breast cancer in her grandmother at age 80 and in her sister at age 40.  MICHELLE lifetime estimated breast cancer risk 34.9% She has a history of right 10:00 breast biopsy in 11/2006, pathology benign.  s/p bilateral breast augmentation 5/26/2023 Bilateral breast MRI: negative, intact bilateral implants. Bi-rads 2 8/25/2023 Bilateral mammogram: Eleuterio Lifetime Risk: 20.8% Bilateral subpectoral silicone breast implants in place, limiting sensitivity of exam. Left breast capsular calcification. Biopsy marker in the outer left breast. BI-RADS 2. Bilateral ultrasound: Right: 9:00, 10 cm from the nipple, a 0.6 x 0.5 x 0.6 cm lymph node and a 0.2 x 0.2 x 0.3 cm lymph node, stable. Left: Axillary tail, a 0.6 x 0.6 x 0.4 cm lymph node, stable. 6:00, retroareolar, a 5 mm cyst. BI-RADS 2. 5/24/2024 Bilateral MRI: Benign. BI-RADS 2. She lives in [place]. She will likely undergo breast imaging in [place] and return to me once a year for her annual clinical breast examination. She denies any current breast concerns.

## 2024-10-25 ENCOUNTER — APPOINTMENT (OUTPATIENT)
Dept: ULTRASOUND IMAGING | Facility: CLINIC | Age: 54
End: 2024-10-25
Payer: COMMERCIAL

## 2024-10-25 ENCOUNTER — OUTPATIENT (OUTPATIENT)
Dept: OUTPATIENT SERVICES | Facility: HOSPITAL | Age: 54
LOS: 1 days | End: 2024-10-25
Payer: COMMERCIAL

## 2024-10-25 ENCOUNTER — RESULT REVIEW (OUTPATIENT)
Age: 54
End: 2024-10-25

## 2024-10-25 ENCOUNTER — APPOINTMENT (OUTPATIENT)
Dept: MAMMOGRAPHY | Facility: CLINIC | Age: 54
End: 2024-10-25
Payer: COMMERCIAL

## 2024-10-25 DIAGNOSIS — Z12.39 ENCOUNTER FOR OTHER SCREENING FOR MALIGNANT NEOPLASM OF BREAST: ICD-10-CM

## 2024-10-25 DIAGNOSIS — Z91.89 OTHER SPECIFIED PERSONAL RISK FACTORS, NOT ELSEWHERE CLASSIFIED: ICD-10-CM

## 2024-10-25 PROCEDURE — 76641 ULTRASOUND BREAST COMPLETE: CPT | Mod: 26,50

## 2024-10-25 PROCEDURE — 77063 BREAST TOMOSYNTHESIS BI: CPT

## 2024-10-25 PROCEDURE — 76641 ULTRASOUND BREAST COMPLETE: CPT

## 2024-10-25 PROCEDURE — 77067 SCR MAMMO BI INCL CAD: CPT | Mod: 26

## 2024-10-25 PROCEDURE — 77067 SCR MAMMO BI INCL CAD: CPT

## 2024-10-25 PROCEDURE — 77063 BREAST TOMOSYNTHESIS BI: CPT | Mod: 26

## 2025-07-30 ENCOUNTER — NON-APPOINTMENT (OUTPATIENT)
Age: 55
End: 2025-07-30

## 2025-08-01 ENCOUNTER — APPOINTMENT (OUTPATIENT)
Dept: PLASTIC SURGERY | Facility: CLINIC | Age: 55
End: 2025-08-01
Payer: COMMERCIAL

## 2025-08-01 VITALS
SYSTOLIC BLOOD PRESSURE: 155 MMHG | WEIGHT: 160 LBS | DIASTOLIC BLOOD PRESSURE: 97 MMHG | HEART RATE: 98 BPM | TEMPERATURE: 97.6 F | HEIGHT: 61 IN | BODY MASS INDEX: 30.21 KG/M2 | OXYGEN SATURATION: 99 %

## 2025-08-01 DIAGNOSIS — Z12.12 ENCOUNTER FOR SCREENING FOR MALIGNANT NEOPLASM OF RECTUM: ICD-10-CM

## 2025-08-01 PROCEDURE — 99213 OFFICE O/P EST LOW 20 MIN: CPT

## 2025-08-05 ENCOUNTER — OUTPATIENT (OUTPATIENT)
Dept: OUTPATIENT SERVICES | Facility: HOSPITAL | Age: 55
LOS: 1 days | End: 2025-08-05
Payer: COMMERCIAL

## 2025-08-05 ENCOUNTER — APPOINTMENT (OUTPATIENT)
Dept: MRI IMAGING | Facility: CLINIC | Age: 55
End: 2025-08-05
Payer: COMMERCIAL

## 2025-08-05 DIAGNOSIS — Z00.00 ENCOUNTER FOR GENERAL ADULT MEDICAL EXAMINATION WITHOUT ABNORMAL FINDINGS: ICD-10-CM

## 2025-08-05 DIAGNOSIS — Z91.89 OTHER SPECIFIED PERSONAL RISK FACTORS, NOT ELSEWHERE CLASSIFIED: ICD-10-CM

## 2025-08-05 DIAGNOSIS — Z12.39 ENCOUNTER FOR OTHER SCREENING FOR MALIGNANT NEOPLASM OF BREAST: ICD-10-CM

## 2025-08-05 PROCEDURE — C8937: CPT

## 2025-08-05 PROCEDURE — A9585: CPT

## 2025-08-05 PROCEDURE — 77049 MRI BREAST C-+ W/CAD BI: CPT | Mod: 26

## 2025-08-05 PROCEDURE — C8908: CPT
